# Patient Record
Sex: MALE | Race: WHITE
[De-identification: names, ages, dates, MRNs, and addresses within clinical notes are randomized per-mention and may not be internally consistent; named-entity substitution may affect disease eponyms.]

---

## 2018-11-06 ENCOUNTER — HOSPITAL ENCOUNTER (EMERGENCY)
Dept: HOSPITAL 74 - JERFT | Age: 53
Discharge: HOME | End: 2018-11-06
Payer: COMMERCIAL

## 2018-11-06 VITALS — BODY MASS INDEX: 24.8 KG/M2

## 2018-11-06 VITALS — DIASTOLIC BLOOD PRESSURE: 86 MMHG | SYSTOLIC BLOOD PRESSURE: 141 MMHG | HEART RATE: 86 BPM | TEMPERATURE: 98 F
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
## 2018-11-06 DIAGNOSIS — S62.666A: Primary | ICD-10-CM
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
## 2018-11-06 DIAGNOSIS — S61.316A: ICD-10-CM
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
## 2018-11-06 PROCEDURE — 3E03329 INTRODUCTION OF OTHER ANTI-INFECTIVE INTO PERIPHERAL VEIN, PERCUTANEOUS APPROACH: ICD-10-PCS

## 2018-11-06 PROCEDURE — 3E0234Z INTRODUCTION OF SERUM, TOXOID AND VACCINE INTO MUSCLE, PERCUTANEOUS APPROACH: ICD-10-PCS

## 2018-11-06 PROCEDURE — 0HQFXZZ REPAIR RIGHT HAND SKIN, EXTERNAL APPROACH: ICD-10-PCS

## 2018-11-06 NOTE — PDOC
History of Present Illness





- General


Chief Complaint: Injury


Stated Complaint: INJURY, RT HAND


Time Seen by Provider: 11/06/18 13:30





- History of Present Illness


Initial Comments: 





11/06/18 13:36


53-year-old male presents for evaluation of right fifth finger injury. He 

states he got his fifth finger caught in the door of the truck as it was 

closing. He presents for further evaluation and treatment. He is not current on 

tetanus





Past History





- Past Medical History


Allergies/Adverse Reactions: 


 Allergies











Allergy/AdvReac Type Severity Reaction Status Date / Time


 


No Known Allergies Allergy   Verified 11/06/18 12:42











Home Medications: 


Ambulatory Orders





NK [No Known Home Medication]  11/06/18 








COPD: No





- Suicide/Smoking/Psychosocial Hx


Smoking History: Never smoked





**Review of Systems





- Review of Systems


Musculoskeletal: Yes: See HPI


All Other Systems: Reviewed and Negative





*Physical Exam





- Vital Signs


 Last Vital Signs











Temp Pulse Resp BP Pulse Ox


 


 98 F   86   18   141/86   99 


 


 11/06/18 12:40  11/06/18 12:40  11/06/18 12:40  11/06/18 12:40  11/06/18 12:40














- Physical Exam


Comments: 





11/06/18 13:39


Left fifth finger skin color and temperature are normal the proximal aspect of 

the nail is exposed. FDS and FDP are well-preserved. There are no gross 

sensorimotor deficits is neurovascular intact.





ED Treatment Course





- RADIOLOGY


Radiology Studies Ordered: 














 Category Date Time Status


 


 FINGER(S) RIGHT [RAD] Stat Radiology  11/06/18 13:31 Ordered














- Medications


Given in the ED: 


ED Medications














Discontinued Medications














Generic Name Dose Route Start Last Admin





  Trade Name Freq  PRN Reason Stop Dose Admin


 


Diphtheria/Tetanus/Acell Pertussis  0.5 ml  11/06/18 13:31  11/06/18 13:34





  Boostrix -  IM  11/06/18 13:32  0.5 ml





  .ONCE ONE   Administration





     





     





     





     














Medical Decision Making





- Medical Decision Making





11/06/18 14:13


The finger was aseptically prepped and anesthetized with 6 mL of 1% lidocaine 

without epinephrine for digital block. After appropriate anesthesia the wound 

was explored there was no communication with the fracture subcutaneous fat was 

exposed the proximal aspect of the nail as well as nail matrix was preserved 

and held down with 2 interrupted simple sutures using 3-0 chromic gut this was 

tolerated well and a dry sterile dressing was placed as well as a splint for 

protection





*DC/Admit/Observation/Transfer


Diagnosis at time of Disposition: 


 Phalanx, distal fracture of finger








- Discharge Dispostion


Disposition: HOME


Condition at time of disposition: Stable


Decision to Admit order: No





- Referrals


Referrals: 


Mayco Ellison MD [Staff Physician] - 





- Patient Instructions


Printed Discharge Instructions:  Finger Fracture, DI for Finger Fracture


Additional Instructions: 


Please keep the dressing on for the next 48 hours. Return to the emergency room 

should develop increasing pain and redness swelling or drainage from the area 

of your wound. Follow-up with hand surgery in 2-3 days for further evaluation 

and treatment options. Her tetanus was updated today. He may take Tylenol and 

Motrin as directed for pain.





- Post Discharge Activity